# Patient Record
Sex: FEMALE
[De-identification: names, ages, dates, MRNs, and addresses within clinical notes are randomized per-mention and may not be internally consistent; named-entity substitution may affect disease eponyms.]

---

## 2021-07-29 ENCOUNTER — NURSE TRIAGE (OUTPATIENT)
Dept: OTHER | Facility: CLINIC | Age: 61
End: 2021-07-29

## 2021-07-29 NOTE — TELEPHONE ENCOUNTER
Reason for Disposition   Caller has URGENT medication question about med that PCP or specialist prescribed and triager unable to answer question    Answer Assessment - Initial Assessment Questions  1. NAME of MEDICATION: \"What medicine are you calling about? \"      See question number 2    2. QUESTION: Juan David Been is your question? \"      How do I change from effexor to lexapro? My doctor ordered this change but gave no instruction as far as switching or if it needs to be tapered etc.    3. PRESCRIBING HCP: \"Who prescribed it? \" Reason: if prescribed by specialist, call should be referred to that group. Her pcp    4. SYMPTOMS: \"Do you have any symptoms? \"      No symptoms    5. SEVERITY: If symptoms are present, ask \"Are they mild, moderate or severe? \"      N/a    6. PREGNANCY:  \"Is there any chance that you are pregnant? \" \"When was your last menstrual period? \"      Not asked    Protocols used: MEDICATION QUESTION CALL-ADULT-[place]    Brief description of triage: see above    Triage indicates for patient to discuss with pcp and callback by nurse within 1 hour    Care advice provided, patient verbalizes understanding; denies any other questions or concerns; instructed to call back for any new or worsening symptoms. This triage is a result of a call to [address] [place] [address] of [facility]. Please do not respond to the triage nurse through this encounter. Any subsequent communication should be directly with the patient.

## 2023-08-20 ENCOUNTER — NURSE TRIAGE (OUTPATIENT)
Dept: OTHER | Facility: CLINIC | Age: 63
End: 2023-08-20